# Patient Record
Sex: FEMALE | Race: WHITE
[De-identification: names, ages, dates, MRNs, and addresses within clinical notes are randomized per-mention and may not be internally consistent; named-entity substitution may affect disease eponyms.]

---

## 2017-04-13 ENCOUNTER — HOSPITAL ENCOUNTER (OUTPATIENT)
Dept: HOSPITAL 45 - C.LAB1850 | Age: 57
Discharge: HOME | End: 2017-04-13
Attending: INTERNAL MEDICINE
Payer: COMMERCIAL

## 2017-04-13 DIAGNOSIS — R76.8: ICD-10-CM

## 2017-04-13 DIAGNOSIS — M79.7: Primary | ICD-10-CM

## 2017-04-13 DIAGNOSIS — E55.9: ICD-10-CM

## 2017-04-13 DIAGNOSIS — M85.80: ICD-10-CM

## 2017-04-13 DIAGNOSIS — R77.8: ICD-10-CM

## 2017-04-13 LAB
ALBUMIN/GLOB SERPL: 1.1 {RATIO} (ref 0.9–2)
ALP SERPL-CCNC: 91 U/L (ref 45–117)
ALT SERPL-CCNC: 14 U/L (ref 12–78)
ANION GAP SERPL CALC-SCNC: 5 MMOL/L (ref 3–11)
APPEARANCE UR: CLEAR
AST SERPL-CCNC: 11 U/L (ref 15–37)
BASOPHILS # BLD: 0.04 K/UL (ref 0–0.2)
BASOPHILS NFR BLD: 0.4 %
BILIRUB UR-MCNC: (no result) MG/DL
BUN SERPL-MCNC: 5 MG/DL (ref 7–18)
BUN/CREAT SERPL: 7.9 (ref 10–20)
CALCIUM SERPL-MCNC: 9 MG/DL (ref 8.5–10.1)
CHLORIDE SERPL-SCNC: 101 MMOL/L (ref 98–107)
CO2 SERPL-SCNC: 32 MMOL/L (ref 21–32)
COLOR UR: YELLOW
COMPLETE: YES
CREAT SERPL-MCNC: 0.61 MG/DL (ref 0.6–1.2)
EOSINOPHIL NFR BLD AUTO: 400 K/UL (ref 130–400)
GLOBULIN SER-MCNC: 3.3 GM/DL (ref 2.5–4)
GLUCOSE SERPL-MCNC: 80 MG/DL (ref 70–99)
HCT VFR BLD CALC: 36.7 % (ref 37–47)
IG%: 0.3 %
IMM GRANULOCYTES NFR BLD AUTO: 29.8 %
LYMPHOCYTES # BLD: 3.15 K/UL (ref 1.2–3.4)
MANUAL MICROSCOPIC REQUIRED?: NO
MCH RBC QN AUTO: 29.8 PG (ref 25–34)
MCHC RBC AUTO-ENTMCNC: 33.2 G/DL (ref 32–36)
MCV RBC AUTO: 89.5 FL (ref 80–100)
MONOCYTES NFR BLD: 7.4 %
NEUTROPHILS # BLD AUTO: 3.3 %
NEUTROPHILS NFR BLD AUTO: 58.8 %
NITRITE UR QL STRIP: (no result)
PH UR STRIP: 7 [PH] (ref 4.5–7.5)
PMV BLD AUTO: 9.6 FL (ref 7.4–10.4)
POTASSIUM SERPL-SCNC: 4.1 MMOL/L (ref 3.5–5.1)
RBC # BLD AUTO: 4.1 M/UL (ref 4.2–5.4)
REVIEW REQ?: NO
SODIUM SERPL-SCNC: 138 MMOL/L (ref 136–145)
SP GR UR STRIP: 1.01 (ref 1–1.03)
URINE EPITHELIAL CELL AUTO: >30 /LPF (ref 0–5)
UROBILINOGEN UR-MCNC: (no result) MG/DL
WBC # BLD AUTO: 10.57 K/UL (ref 4.8–10.8)

## 2017-04-19 LAB
ANA TITR SER: (no result) TITER
ANTI-CENTROMERE AB: (no result) AI
DNA DS CRITHIDIA: NEGATIVE
ENA SM AB SER-ACNC: (no result) AI
ENA SS-A IGG SER QL IA: (no result) AI
ENA SS-B AB SER-ACNC: (no result) AI

## 2017-05-23 ENCOUNTER — HOSPITAL ENCOUNTER (OUTPATIENT)
Dept: HOSPITAL 45 - C.PAPS | Age: 57
Discharge: HOME | End: 2017-05-23
Attending: OBSTETRICS & GYNECOLOGY
Payer: COMMERCIAL

## 2017-05-23 DIAGNOSIS — Z01.419: Primary | ICD-10-CM

## 2018-05-07 ENCOUNTER — HOSPITAL ENCOUNTER (OUTPATIENT)
Dept: HOSPITAL 45 - C.MAMM | Age: 58
Discharge: HOME | End: 2018-05-07
Attending: OBSTETRICS & GYNECOLOGY
Payer: COMMERCIAL

## 2018-05-07 DIAGNOSIS — Z12.31: Primary | ICD-10-CM

## 2018-05-08 NOTE — MAMMOGRAPHY REPORT
BILATERAL DIGITAL SCREENING MAMMOGRAM TOMOSYNTHESIS WITH CAD: 5/7/2018

CLINICAL HISTORY: Routine screening.  Patient has no complaints.  





TECHNIQUE:  Breast tomosynthesis in addition to standard 2D mammography was performed. Current study 
was also evaluated with a Computer Aided Detection (CAD) system.  



COMPARISON: Comparison is made to exams dated:  5/4/2017 mammogram, 7/28/2014 mammogram, 7/12/2013 ma
mmogram, 7/11/2012 mammogram, 7/11/2011 mammogram, and 7/8/2010 mammogram - [facility]
ter.   



BREAST COMPOSITION:  The tissue of both breasts is extremely dense, which lowers the sensitivity of m
ammography.  



FINDINGS:  The parenchymal pattern is unchanged. No developing mass, architectural distortion or clus
ter of suspicious microcalcifications is seen in either breast.  



IMPRESSION:  ACR BI-RADS CATEGORY 2: BENIGN

There is no mammographic evidence of malignancy. A 1 year screening mammogram is recommended.  The pa
tient will receive written notification of the results.  





Approximately 10% of breast cancers are not detected with mammography. A negative mammographic report
 should not delay biopsy if a clinically suggestive mass is present.



Meenu Seo M.D.          

ay/:5/7/2018 16:06:24  



Imaging Technologist: Mona DALAL(R)(M), [facility]

letter sent: Normal 1/2  

BI-RADS Code: ACR BI-RADS Category 2: Benign

## 2018-08-02 ENCOUNTER — HOSPITAL ENCOUNTER (OUTPATIENT)
Dept: HOSPITAL 45 - C.LAB1850 | Age: 58
Discharge: HOME | End: 2018-08-02
Attending: INTERNAL MEDICINE
Payer: COMMERCIAL

## 2018-08-02 DIAGNOSIS — R76.8: ICD-10-CM

## 2018-08-02 DIAGNOSIS — M34.1: ICD-10-CM

## 2018-08-02 DIAGNOSIS — M85.80: ICD-10-CM

## 2018-08-02 DIAGNOSIS — E55.9: Primary | ICD-10-CM

## 2018-08-02 LAB
ALBUMIN SERPL-MCNC: 4 GM/DL (ref 3.4–5)
ALP SERPL-CCNC: 80 U/L (ref 45–117)
ALT SERPL-CCNC: 31 U/L (ref 12–78)
AST SERPL-CCNC: 22 U/L (ref 15–37)
BASOPHILS # BLD: 0.05 K/UL (ref 0–0.2)
BASOPHILS NFR BLD: 0.7 %
CREAT SERPL-MCNC: 0.65 MG/DL (ref 0.6–1.2)
EOS ABS #: 0.13 K/UL (ref 0–0.5)
EOSINOPHIL NFR BLD AUTO: 314 K/UL (ref 130–400)
HCT VFR BLD CALC: 42 % (ref 37–47)
HGB BLD-MCNC: 14.3 G/DL (ref 12–16)
IG#: 0.01 K/UL (ref 0–0.02)
IMM GRANULOCYTES NFR BLD AUTO: 41.4 %
LYMPHOCYTES # BLD: 2.9 K/UL (ref 1.2–3.4)
MCH RBC QN AUTO: 29.7 PG (ref 25–34)
MCHC RBC AUTO-ENTMCNC: 34 G/DL (ref 32–36)
MCV RBC AUTO: 87.3 FL (ref 80–100)
MONO ABS #: 0.62 K/UL (ref 0.11–0.59)
MONOCYTES NFR BLD: 8.9 %
NEUT ABS #: 3.29 K/UL (ref 1.4–6.5)
NEUTROPHILS # BLD AUTO: 1.9 %
NEUTROPHILS NFR BLD AUTO: 47 %
PMV BLD AUTO: 9.8 FL (ref 7.4–10.4)
PROT SERPL-MCNC: 7.2 GM/DL (ref 6.4–8.2)
RED CELL DISTRIBUTION WIDTH CV: 15.8 % (ref 11.5–14.5)
RED CELL DISTRIBUTION WIDTH SD: 50.6 FL (ref 36.4–46.3)
WBC # BLD AUTO: 7 K/UL (ref 4.8–10.8)

## 2018-08-06 LAB
ANA SCREEN  TC 249X: POSITIVE
COMPLEMENT C4** TC 44982E: 19 MG/DL (ref 15–57)
ENA SS-A IGG SER QL IA: (no result) AI
ENA SS-B AB SER-ACNC: (no result) AI

## 2018-08-08 LAB — ANA TITR SER: (no result) TITER

## 2018-08-16 NOTE — HISTORY AND PHYSICAL
History & Physical


Date of Service


Aug 16, 2018.





History & Physical











Chief Complaint rm#6 here for discussion about poss aorto bifem, had previous 

[place]





 History of Present Illness I the pleasure of seeing Mrs. Miranda today for 

evaluation of her lower extremity pain. As you know she is a 57-year-old white 

female who has had a cross femoral bypass done in the past for a right iliac 

artery occlusion. Since then she is developed claudication symptoms in her 

lower extremity on both sides. She claims that that very short distances of 

approximately 15 feet. If she walks a block she has to stop 3 times. She 

describes the pain as a burning pain that starts in her legs and goes up to her 

buttock. Both sides are the same in intensity. She also says that the burning 

pain occurs while standing at home. She does claim that she does get the 

burning pain in the morning when she gets up out of bed and sometimes is 

present at night. She denies any ulcerations of her feet she denies any 

significant symptoms of rest pain. She has had multiple workup for this 

problem. She was scheduled for an aortobifemoral bypass at another institution. 

She also gives a history of fibromyalgia, scleroderma, and Raynaud's. She has 

had a cardiac workup recently which was negative for significant coronary 

disease. 





Review of Systems 10 system review of system was asked. Only positive findings 

were intolerance to cold joint swelling heartburn in the history of present 

illness complaints.


 


Physical Exam 


Vitals & Measurements HR: 103 (Monitored) BP: 162/74 SpO2: 99% WT: 60.9 kg 

Physical exam: The patient is awake oriented x3 she is in no apparent distress 

she has normal body habitus. Her blood pressure is 162/74 in the right 156/70 

in the left. Head and neck within normal limits there are no carotid bruits 

lungs are clear. Heart had a regular rate and rhythm. Abdominal exam is benign. 

There is noaneurysmal dilatation of the aorta. Her femoral pulses are +2 

bilaterally. Dorsalis pedis are +2 bilaterally. The posterior tibial pulses are 

+1 bilaterally. There is good capillary refill in both feet. There is no 

significant neurological deficits either lower extremity.





 Assessment/Plan 1.Aorto-iliac atherosclerosis





 Plan:


   At this point in time I am not sure an aortobifemoral bypass would benefit 

this patient. Her pertinent findings from her previous workup was a significant 

drop in pressure after her walking treadmill. She dropped from a 0.85 index to 

an index in the 0.4 range. This was seen on both sides. 


She also has an SMA stenosis which may have worsened over the last few years 

but gives no signs of abdominal complaints consistent with mesenteric ischemia.


   In view of her walking treadmill findings I believe the best thing would be 

to do an arteriogram of her abdominal aorta and runoffs. We will also measure 

pressures in the left iliac system. If there is a focal stenosis in the left 

iliac he may benefit from stent placement. If there is significant drop off 

sooner her aortic pressures during the pull-through then she may benefit from 

the aortobifemoral bypass.





We will make a decision on what the best approach to this patient either 

surgically, endovascularly, or just observation after the arteriograms 

performed.





Thank you very much for letting us participate in the care of this patient.





Sincerely,





JAMMIE Crooks MD Problem List/Past Medical History Ongoing Aorto-iliac 

atherosclerosis CREST syndrome Hyperlipidemia Hypertensive disorder Systemic 

sclerosis Tobacco abuse Historical No qualifying data 





Procedure/Surgical History Esophagogastroduodenoscopy (10/24/2012), 

Cholecystectomy planned (2011), Laparoscopy (01/27/2010), Colonoscopy (12/29/ 2009), Esophagogastroduodenoscopy (12/29/2009), Esophagogastroduodenoscopy (2004

), Cervical vertebral fusion. (2002), Hysterectomy (2000). 





Medications Inpatient 


No active inpatient medications Home acetaminophen-HYDROcodone 325 mg-7.5 mg 

oral tablet, 1 tab, PO, qid, PRN Ambien 10 mg oral tablet, 10 mg= 1 tab, PO, qhs

, PRN aspirin 81 mg oral delayed release tablet, 81 mg= 1 tab, PO, Daily 

doxepin 100 mg oral capsule, 100 mg= 1 cap, PO, qhs estradiol 1 mg oral tablet, 

1 mg= 1 tab, PO, Daily losartan 100 mg oral tablet, 100 mg= 1 tab, PO, Daily 

Norvasc 5 mg oral tablet, 5 mg= 1 tab, PO, Daily omeprazole 40 mg oral delayed 

release capsule, 40 mg= 1 cap, PO, Daily Soma 350 mg oral tablet, 350 mg= 1 tab

, PO, 5x/Day Vitamin D3 5000 intl units oral tablet, 5000 Int_Unit= 1 tab, PO, 

Daily Zomig 5 mg oral tablet, 5 mg= 1 tab, PO, ONCE, PRN Allergies Imitrex (

severe headache) Family History Brain cancer..: Father. Diabetes: Mother. 








Electronic Signature on File








Electronically Reviewed/Signed by: Tyler Crooks MD Author Signature Dt/Tm:08 /09/2018 11:12 AM





Monty MORSE [facility]


[facility], [address]


[place] [address]











EJS DD: 08/09/18











Result Type: Physician Consult


 


Date of Service: August 09, 2018 11:12 EDT


 


Authorization Status: Final


 


Subject: Consult Note


 


Author or Import Date: MD Crooks Eugene J on August 09, 2018 11:12 EDT


 


Verified By: MD Crooks Eugene J on August 09, 2018 11:12 EDT


 


Encounter info: [id number], [facility] [place], Clinic, 8/9/2018 - 8/9/2018

## 2018-08-17 ENCOUNTER — HOSPITAL ENCOUNTER (OUTPATIENT)
Dept: HOSPITAL 45 - C.ACU | Age: 58
Discharge: HOME | End: 2018-08-17
Attending: SURGERY
Payer: COMMERCIAL

## 2018-08-17 VITALS — HEART RATE: 86 BPM | SYSTOLIC BLOOD PRESSURE: 132 MMHG | OXYGEN SATURATION: 99 % | DIASTOLIC BLOOD PRESSURE: 58 MMHG

## 2018-08-17 VITALS
WEIGHT: 130.27 LBS | WEIGHT: 130.27 LBS | HEIGHT: 62.99 IN | BODY MASS INDEX: 23.08 KG/M2 | BODY MASS INDEX: 23.08 KG/M2 | HEIGHT: 62.99 IN | BODY MASS INDEX: 23.08 KG/M2

## 2018-08-17 VITALS — OXYGEN SATURATION: 99 % | DIASTOLIC BLOOD PRESSURE: 73 MMHG | HEART RATE: 80 BPM | SYSTOLIC BLOOD PRESSURE: 158 MMHG

## 2018-08-17 VITALS
DIASTOLIC BLOOD PRESSURE: 79 MMHG | OXYGEN SATURATION: 100 % | HEART RATE: 82 BPM | TEMPERATURE: 97.7 F | SYSTOLIC BLOOD PRESSURE: 147 MMHG

## 2018-08-17 VITALS
TEMPERATURE: 97.52 F | SYSTOLIC BLOOD PRESSURE: 117 MMHG | DIASTOLIC BLOOD PRESSURE: 95 MMHG | HEART RATE: 77 BPM | OXYGEN SATURATION: 98 %

## 2018-08-17 VITALS — OXYGEN SATURATION: 100 % | HEART RATE: 80 BPM | SYSTOLIC BLOOD PRESSURE: 142 MMHG | DIASTOLIC BLOOD PRESSURE: 61 MMHG

## 2018-08-17 VITALS — HEART RATE: 81 BPM | OXYGEN SATURATION: 99 % | DIASTOLIC BLOOD PRESSURE: 68 MMHG | SYSTOLIC BLOOD PRESSURE: 164 MMHG

## 2018-08-17 VITALS — OXYGEN SATURATION: 98 % | DIASTOLIC BLOOD PRESSURE: 60 MMHG | SYSTOLIC BLOOD PRESSURE: 127 MMHG | HEART RATE: 83 BPM

## 2018-08-17 DIAGNOSIS — Z88.1: ICD-10-CM

## 2018-08-17 DIAGNOSIS — Z87.891: ICD-10-CM

## 2018-08-17 DIAGNOSIS — M79.7: ICD-10-CM

## 2018-08-17 DIAGNOSIS — Z88.0: ICD-10-CM

## 2018-08-17 DIAGNOSIS — I10: ICD-10-CM

## 2018-08-17 DIAGNOSIS — I70.203: Primary | ICD-10-CM

## 2018-08-17 DIAGNOSIS — J44.9: ICD-10-CM

## 2018-08-17 LAB
BUN SERPL-MCNC: 5 MG/DL (ref 7–18)
CALCIUM SERPL-MCNC: 8.8 MG/DL (ref 8.5–10.1)
CO2 SERPL-SCNC: 27 MMOL/L (ref 21–32)
CREAT SERPL-MCNC: 0.6 MG/DL (ref 0.6–1.2)
GLUCOSE SERPL-MCNC: 80 MG/DL (ref 70–99)
INR PPP: 1 (ref 0.9–1.1)
POTASSIUM SERPL-SCNC: 3.8 MMOL/L (ref 3.5–5.1)
PTT PATIENT: 29.8 SECONDS (ref 21–31)
SODIUM SERPL-SCNC: 135 MMOL/L (ref 136–145)

## 2018-08-17 NOTE — DISCHARGE INSTRUCTIONS
Discharge Instructions


Date of Service


Aug 17, 2018.





Visit


Reason for Visit:  Aorto Iliac Occlusive Disease





Discharge


Discharge Diagnosis / Problem:  Claudication





Discharge Goals


Goal(s):  Diagnostic testing





Activity Recommendations


Activity Limitations:  per Instructions/Follow-up section





Anesthesia


.





Post Anesthesia Instructions:





If you have had General Anesthesia or IV Sedation:





*  Do not drive today.


*  Resume driving when surgeon permits.


*  Do not make important decisions or sign legal documents today.


*  Call surgeon for:





   1.  Temperature elevations greater than 101 degrees F.


   2.  Uncontrollable pain.


   3.  Excessive bleeding.


   4.  Persistent nausea and vomiting.


   5.  Medication intolerance (nausea, vomiting or rash).





*  For nausea and vomiting use only clear liquids such as: tea, soda, bouillon 

until nausea subsides, then gradually increase diet as tolerated.





*  If you have any concerns or questions, call your surgeon's office.  If 

physician is unavailable and it is an emergency, call 911 or go to the nearest 

emergency room.





.





Instructions / Follow-Up


Instructions / Follow-Up


Call [phone] to schedule a follow up appointment if one not already 

scheduled.





SPECIAL CARE INSTRUCTIONS:





Medications:





*  Continue to take your medications as directed.  If you have been given a 

prescription


   for Plavix, please fill it immediately and take as directed.





Incision Care:





*  Your puncture site may have some bruising and minor swelling for about one 

week.





*  You will have a small dressing covering your puncture site.  You may remove 

the 


    dressing after 24 hours and shower.  You may let the warm soapy water run 

over it, 


    but be sure to dry the puncture site well and keep it dry.





*  DO NOT IMMERSE THE INCISION IN A TUB/POOL/etc. UNTIL HEALED.





*  Puncture sites should be kept covered with a band-aid until it begins to 

heal.





Restrictions:





*  Depending on whether you leg or arm was punctured to access the arteries,


    you will be required to lay flat, hold your arm still, or both, for about 4 

hours


    after the procedure to prevent bleeding.





*  Limit your activity for the first 48 hours.  You may walk and go up and down


   steps.  Avoid excessive bending or movement at the puncture site.





Possible Complications:





*  Excessive Swelling - after blood flow is improved you may notice increased


    swelling in the lower legs.  This is a normal response.  This usually 

depends


    on the amount of blockages in the leg, how long they have been there


    prior to your procedure and how much blood flow was restored.  Elevating


    your legs will help to improve this.  Please notify our office ([phone]

)


    if the swelling does not go away after lying in bed overnight.





*  Infection/Drainage/Bleeding - Drainage or bleeding from the puncture site


    should be minimal.  If you have excessive bleeding or drainage, call our 


    office ([phone]) right away.





*  Pain - You may experience some mild pain or soreness at your puncture site.


    If your pain does not improve, please contact our office ([phone]).





Call your doctor and seek emergent treatment if you develop:





*  Temperature above 101 degrees





*  Any fever or chills





*  Any redness or purulent drainage from the puncture site





*  Any new dusky/blue colored toes or feet with coolness or sharp or aching 

pain.





SKIN IRRITATION:





*  You may experience some redness and/or swelling in the area where radiation 

was


   administered.  If any skin irritation occurs, please contact your family 

physician.








FOLLOW UP VISIT:





Keep any scheduled doctor appointments.





Diet Recommendations


Recommended Home Diet:  resume previous diet





Procedures


Procedures Performed:  


Aortogram With Bilateral Run Off, Mechanical Closure of Left Femoral Artery.





Pending Studies


Studies pending at discharge:  no





Medical Emergencies








.


Who to Call and When:





Medical Emergencies:  If at any time you feel your situation is an emergency, 

please call 911 immediately.





.





Non-Emergent Contact


Non-Emergency issues call your:  Surgeon





.


.








"Provider Documentation" section prepared by Tyler Crooks.








.

## 2018-08-17 NOTE — [facility] OPERATIVE REPORT
Operative Report


Operative Date


Aug 17, 2018.





Pre-Operative Diagnosis





Claudication, Left Iliac Artery Stenosis.





Post-Operative Diagnosis





Claudication.





Procedure(s) Performed





Aortogram With Bilateral Run Off, Mechanical Closure of Left Femoral Artery.





Surgeon


Dr. Crooks





Assistant Surgeon(s)


Nirali Gayle MD





Estimated Blood Loss


5





Findings


No significant stenosis in aorta, left iliac or femoral-femoral artery bypass. 

3 vessel runoff bilaterally.





Specimens





None





Drains


None





Anesthesia Type


MAC





Complication(s)


none





Disposition


Recovery Room / PACU





Indications


The patient is a 57 year old female who presents with bilateral lower extremity 

short distance claudication. She is status post left to right femoral-femoral 

bypass in 2015 performed in [place]. The risks, benefits and 

alternatives of the procedure were discussed with her and she agreed to proceed.





Description of Procedure


The patient was brought to the angio suite and placed in the supine position. 

She was given preoperative antibiotics with Clindamycin. Local anesthesia was 

administered. The left common femoral artery was accessed under ultrasound 

guidance distal to the site of the femoral-femoral bypass. A 5 Fr sheath was 

placed. An 0.035 angled glidewire was easily advanced to the aorta. A 5 Fr 

Merit pigtail catheter was placed in the aorta and aortogram was completed with 

power injection. The bilateral renal arteries showed no evidence of stenosis. 

Known occlusion of the right common iliac artery. No significant stenosis of 

aorta or left common and external iliac. Bilateral runoff was completed which 

showed no stenosis of the femoral-femoral bypass and 3 vessel runoff to the 

feet bilaterally. The pigtail catheter was removed. A 5 Fr straight glide cath 

was inserted and pullback pressures were measured which showed no significant 

drop in pressures through the aorta into the left iliac artery. The catheter 

and sheath were removed. The puncture site was closed with the Starclose device 

which partially closed. Pressure was held over the left femoral artery and 

hemostasis was achieved. The patient tolerated the procedure well and was 

brought to the PACU in good condition.





Dr. Crooks was present and scrubbed for the entire procedure.


I attest to the content of the Intraoperative Record and any orders documented 

therein.  Any exceptions are noted below.

## 2018-08-17 NOTE — DIAGNOSTIC IMAGING REPORT
CHEST 2 VIEWS ROUTINE



CLINICAL HISTORY: 57 years-old Female presenting with PREOPERATIVE EXAM. 



TECHNIQUE: PA and lateral views of the chest were obtained.



COMPARISON: 1/27/2010.



FINDINGS:

Atherosclerosis of the aortic arch. Cardiac silhouette normal in size.

Pleural-parenchymal scarring at the apices has increased from prior. There is

also suggestion of greater than expected opacity in the region of the right

upper lung, which is most prominent on lateral view. No pleural effusion or

pneumothorax. Degenerative changes of the thoracic spine. Cholecystectomy clips

noted.



IMPRESSION:

1.  Increased pleural-parenchymal scarring at the apices with suggestion of a

right upper lobe infiltrate. Further evaluation with chest CT to be considered.



The report will be called/faxed according to standard departmental protocol.







Electronically signed by:  Sj Rivera M.D.

8/17/2018 6:53 AM



Dictated Date/Time:  8/17/2018 6:51 AM

## 2018-08-17 NOTE — [facility] POST OPERATIVE BRIEF NOTE
Immediate Operative Summary


Operative Date


Aug 17, 2018.





Pre-Operative Diagnosis





Claudication, Left Iliac Artery Stenosis.





Post-Operative Diagnosis





Claudication.





Procedure(s) Performed





Aortogram With Bilateral Run Off, Mechanical Closure of Left Femoral Artery.





Surgeon


Dr. Crooks





Assistant Surgeon(s)


Nirali Gayle MD





Estimated Blood Loss


5





Findings


Consistent with Post-Op Diagnosis





Specimens





None





Drains


None





Anesthesia Type


MAC





Complication(s)


none





Disposition


Accompanied Pt To Recover:  no


Disposition:  Recovery Room / PACU

## 2018-08-17 NOTE — ANESTHESIOLOGY PROGRESS NOTE
Anesthesia Post Op Note


Date & Time


Aug 17, 2018 at 09:38





Vital Signs


Pain Intensity:  0





Vital Signs Past 12 Hours








  Date Time  Temp Pulse Resp B/P (MAP) Pulse Ox O2 Delivery O2 Flow Rate FiO2


 


8/17/18 09:15 36.2 78 19 112/59 99 Room Air  


 


8/17/18 06:19 36.5 82 20 147/79 (101) 100 Room Air  











Notes


Mental Status:  alert / awake / arousable, participated in evaluation


Pt Amnestic to Procedure:  Yes


Nausea / Vomiting:  adequately controlled


Pain:  adequately controlled


Airway Patency, RR, SpO2:  stable & adequate


BP & HR:  stable & adequate


Hydration State:  stable & adequate


Anesthetic Complications:  no major complications apparent